# Patient Record
Sex: FEMALE | Race: BLACK OR AFRICAN AMERICAN | NOT HISPANIC OR LATINO | URBAN - METROPOLITAN AREA
[De-identification: names, ages, dates, MRNs, and addresses within clinical notes are randomized per-mention and may not be internally consistent; named-entity substitution may affect disease eponyms.]

---

## 2019-03-16 ENCOUNTER — EMERGENCY (EMERGENCY)
Facility: HOSPITAL | Age: 74
LOS: 1 days | Discharge: HOME | End: 2019-03-16
Attending: EMERGENCY MEDICINE | Admitting: EMERGENCY MEDICINE
Payer: MEDICARE

## 2019-03-16 VITALS
DIASTOLIC BLOOD PRESSURE: 79 MMHG | TEMPERATURE: 98 F | OXYGEN SATURATION: 100 % | HEART RATE: 16 BPM | RESPIRATION RATE: 16 BRPM | SYSTOLIC BLOOD PRESSURE: 107 MMHG

## 2019-03-16 PROCEDURE — 10140 I&D HMTMA SEROMA/FLUID COLLJ: CPT | Mod: LT

## 2019-03-16 PROCEDURE — 99282 EMERGENCY DEPT VISIT SF MDM: CPT | Mod: 25

## 2019-03-16 NOTE — ED ADULT TRIAGE NOTE - NS ED TRIAGE AVPU SCALE
Alert-The patient is alert, awake and responds to voice. The patient is oriented to time, place, and person. The triage nurse is able to obtain subjective information. Ambulatory

## 2019-03-16 NOTE — ED PROVIDER NOTE - NSFOLLOWUPINSTRUCTIONS_ED_ALL_ED_FT
Tylenol 500 mg (1 or 2 every 6 hours) and/or naproxen 500 mg (1 every 12 hours) for pain. Change dressing daily with bacitracin ointment, gauze or band-aid, and ACE wrap. If fluid accumulates, can squeeze it out. Return if spreading redness or if pain not controlled with oral medications.

## 2019-03-16 NOTE — ED PROVIDER NOTE - CLINICAL SUMMARY MEDICAL DECISION MAKING FREE TEXT BOX
73 y/o F with no significant PMHx presents to the ED c/o pain on the L foot. Plan- Will drain the blister and DC home with supportive care.

## 2019-03-16 NOTE — ED PROVIDER NOTE - PHYSICAL EXAMINATION
L ankle: Swollen with 4cm x 4cm blister on L heel. No erythema, no tenderness on any part of the foot. L ankle: Mild edema at lateral malleolus. 4cm x 4cm blister on L heel. No erythema, no tenderness on any part of the foot.    R ankle: Mild edema at lateral malleolus, less than L ankle.

## 2019-03-16 NOTE — ED ADULT TRIAGE NOTE - CHIEF COMPLAINT QUOTE
pt amb to triage c/o b/l ankle swelling (daughter states increased walking day before), boil developed this AM, increased pain w/ walking 2/2 boil, non-draining, fluid noted on back of heel, + pedal pulse, denies fever or chills

## 2019-03-16 NOTE — ED PROCEDURE NOTE - GENERAL PROCEDURE DETAILS
identified blister, cleaned site w/ alcohol swab, used 11-blade scalpel to poke hold in blister, squeezed out all the fluid, covered w/ bacitracin, gauze, and ACE wrap. Instructed daughter how to care for blister.

## 2019-03-16 NOTE — ED PROVIDER NOTE - OBJECTIVE STATEMENT
75 y/o F with no significant PMHx presents to the ED c/o pain on the L foot. Per daughter states pt walks for a long extended amount of time and feel pain when pt bear weights. The pain worsen with walking. Of note, no pain when resting. Pt denotes her L foot is swollen. Denies fever. No other acute complaints at time of eval.

## 2019-04-11 ENCOUNTER — INPATIENT (INPATIENT)
Facility: HOSPITAL | Age: 74
LOS: 7 days | Discharge: NURSING, REHAB OR LONG-TERM CARE | End: 2019-04-19
Attending: HOSPITALIST | Admitting: HOSPITALIST
Payer: MEDICARE

## 2019-04-11 VITALS
OXYGEN SATURATION: 98 % | HEART RATE: 85 BPM | RESPIRATION RATE: 16 BRPM | DIASTOLIC BLOOD PRESSURE: 94 MMHG | SYSTOLIC BLOOD PRESSURE: 132 MMHG | TEMPERATURE: 98 F

## 2019-04-11 DIAGNOSIS — R45.1 RESTLESSNESS AND AGITATION: ICD-10-CM

## 2019-04-11 LAB
ALBUMIN SERPL ELPH-MCNC: 4.3 G/DL — SIGNIFICANT CHANGE UP (ref 3.3–5)
ALP SERPL-CCNC: 89 U/L — SIGNIFICANT CHANGE UP (ref 40–120)
ALT FLD-CCNC: 9 U/L — SIGNIFICANT CHANGE UP (ref 4–33)
AMPHET UR-MCNC: NEGATIVE — SIGNIFICANT CHANGE UP
ANION GAP SERPL CALC-SCNC: 11 MMO/L — SIGNIFICANT CHANGE UP (ref 7–14)
APAP SERPL-MCNC: < 15 UG/ML — LOW (ref 15–25)
APPEARANCE UR: CLEAR — SIGNIFICANT CHANGE UP
AST SERPL-CCNC: 21 U/L — SIGNIFICANT CHANGE UP (ref 4–32)
BARBITURATES UR SCN-MCNC: NEGATIVE — SIGNIFICANT CHANGE UP
BASOPHILS # BLD AUTO: 0.02 K/UL — SIGNIFICANT CHANGE UP (ref 0–0.2)
BASOPHILS NFR BLD AUTO: 0.4 % — SIGNIFICANT CHANGE UP (ref 0–2)
BENZODIAZ UR-MCNC: NEGATIVE — SIGNIFICANT CHANGE UP
BILIRUB SERPL-MCNC: 0.6 MG/DL — SIGNIFICANT CHANGE UP (ref 0.2–1.2)
BILIRUB UR-MCNC: NEGATIVE — SIGNIFICANT CHANGE UP
BLOOD UR QL VISUAL: NEGATIVE — SIGNIFICANT CHANGE UP
BUN SERPL-MCNC: 10 MG/DL — SIGNIFICANT CHANGE UP (ref 7–23)
CALCIUM SERPL-MCNC: 9.7 MG/DL — SIGNIFICANT CHANGE UP (ref 8.4–10.5)
CANNABINOIDS UR-MCNC: NEGATIVE — SIGNIFICANT CHANGE UP
CHLORIDE SERPL-SCNC: 103 MMOL/L — SIGNIFICANT CHANGE UP (ref 98–107)
CO2 SERPL-SCNC: 27 MMOL/L — SIGNIFICANT CHANGE UP (ref 22–31)
COCAINE METAB.OTHER UR-MCNC: NEGATIVE — SIGNIFICANT CHANGE UP
COLOR SPEC: SIGNIFICANT CHANGE UP
CREAT SERPL-MCNC: 0.78 MG/DL — SIGNIFICANT CHANGE UP (ref 0.5–1.3)
EOSINOPHIL # BLD AUTO: 0.01 K/UL — SIGNIFICANT CHANGE UP (ref 0–0.5)
EOSINOPHIL NFR BLD AUTO: 0.2 % — SIGNIFICANT CHANGE UP (ref 0–6)
ETHANOL BLD-MCNC: < 10 MG/DL — SIGNIFICANT CHANGE UP
GLUCOSE SERPL-MCNC: 88 MG/DL — SIGNIFICANT CHANGE UP (ref 70–99)
GLUCOSE UR-MCNC: NEGATIVE — SIGNIFICANT CHANGE UP
HCT VFR BLD CALC: 39.8 % — SIGNIFICANT CHANGE UP (ref 34.5–45)
HGB BLD-MCNC: 12.8 G/DL — SIGNIFICANT CHANGE UP (ref 11.5–15.5)
IMM GRANULOCYTES NFR BLD AUTO: 0.2 % — SIGNIFICANT CHANGE UP (ref 0–1.5)
KETONES UR-MCNC: NEGATIVE — SIGNIFICANT CHANGE UP
LEUKOCYTE ESTERASE UR-ACNC: NEGATIVE — SIGNIFICANT CHANGE UP
LYMPHOCYTES # BLD AUTO: 1.75 K/UL — SIGNIFICANT CHANGE UP (ref 1–3.3)
LYMPHOCYTES # BLD AUTO: 32.6 % — SIGNIFICANT CHANGE UP (ref 13–44)
MCHC RBC-ENTMCNC: 25 PG — LOW (ref 27–34)
MCHC RBC-ENTMCNC: 32.2 % — SIGNIFICANT CHANGE UP (ref 32–36)
MCV RBC AUTO: 77.6 FL — LOW (ref 80–100)
METHADONE UR-MCNC: NEGATIVE — SIGNIFICANT CHANGE UP
MONOCYTES # BLD AUTO: 0.49 K/UL — SIGNIFICANT CHANGE UP (ref 0–0.9)
MONOCYTES NFR BLD AUTO: 9.1 % — SIGNIFICANT CHANGE UP (ref 2–14)
NEUTROPHILS # BLD AUTO: 3.09 K/UL — SIGNIFICANT CHANGE UP (ref 1.8–7.4)
NEUTROPHILS NFR BLD AUTO: 57.5 % — SIGNIFICANT CHANGE UP (ref 43–77)
NITRITE UR-MCNC: NEGATIVE — SIGNIFICANT CHANGE UP
NRBC # FLD: 0 K/UL — SIGNIFICANT CHANGE UP (ref 0–0)
OPIATES UR-MCNC: NEGATIVE — SIGNIFICANT CHANGE UP
OXYCODONE UR-MCNC: NEGATIVE — SIGNIFICANT CHANGE UP
PCP UR-MCNC: NEGATIVE — SIGNIFICANT CHANGE UP
PH UR: 6.5 — SIGNIFICANT CHANGE UP (ref 5–8)
PLATELET # BLD AUTO: 202 K/UL — SIGNIFICANT CHANGE UP (ref 150–400)
PMV BLD: 10 FL — SIGNIFICANT CHANGE UP (ref 7–13)
POTASSIUM SERPL-MCNC: 3.9 MMOL/L — SIGNIFICANT CHANGE UP (ref 3.5–5.3)
POTASSIUM SERPL-SCNC: 3.9 MMOL/L — SIGNIFICANT CHANGE UP (ref 3.5–5.3)
PROT SERPL-MCNC: 7.8 G/DL — SIGNIFICANT CHANGE UP (ref 6–8.3)
PROT UR-MCNC: NEGATIVE — SIGNIFICANT CHANGE UP
RBC # BLD: 5.13 M/UL — SIGNIFICANT CHANGE UP (ref 3.8–5.2)
RBC # FLD: 13.7 % — SIGNIFICANT CHANGE UP (ref 10.3–14.5)
SALICYLATES SERPL-MCNC: < 5 MG/DL — LOW (ref 15–30)
SODIUM SERPL-SCNC: 141 MMOL/L — SIGNIFICANT CHANGE UP (ref 135–145)
SP GR SPEC: 1.01 — SIGNIFICANT CHANGE UP (ref 1–1.04)
TSH SERPL-MCNC: 1.33 UIU/ML — SIGNIFICANT CHANGE UP (ref 0.27–4.2)
UROBILINOGEN FLD QL: NORMAL — SIGNIFICANT CHANGE UP
WBC # BLD: 5.37 K/UL — SIGNIFICANT CHANGE UP (ref 3.8–10.5)
WBC # FLD AUTO: 5.37 K/UL — SIGNIFICANT CHANGE UP (ref 3.8–10.5)

## 2019-04-11 PROCEDURE — 70450 CT HEAD/BRAIN W/O DYE: CPT | Mod: 26
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
## 2019-04-11 NOTE — ED PROVIDER NOTE - CLINICAL SUMMARY MEDICAL DECISION MAKING FREE TEXT BOX
Jonathan Weil, PGY2 - well appearing but still with need to r/o secondary causes of delirium. If no sig findings then admit for titration of mood control meds and placement. Pt c h/o dementia p/w confusion and agitation.  Well appearing, no apparent injuries, but still with need to r/o secondary causes of delirium. If no sig findings then admit for titration of mood control meds and placement.
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
## 2019-04-11 NOTE — ED PROVIDER NOTE - ATTENDING CONTRIBUTION TO CARE
Attending Attestation: Dr. Jennings  I have personally performed a history and physical examination of the patient and discussed management with the resident as well as the patient.  I reviewed the resident's note and agree with the documented findings and plan of care.  I have authored and modified critical sections of the Provider Note, including but not limited to HPI, Physical Exam and MDM. Pt c h/o dementia p/w confusion and agitation.  Well appearing, no apparent injuries, but still with need to r/o secondary causes of delirium. If no sig findings then admit for titration of mood control meds and placement.

## 2019-04-11 NOTE — ED PROVIDER NOTE - NEUROLOGICAL, MLM
Alert and oriented, no focal deficits, no motor or sensory deficits. Inappropriate responses to many questions
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
## 2019-04-12 DIAGNOSIS — R45.1 RESTLESSNESS AND AGITATION: ICD-10-CM

## 2019-04-12 DIAGNOSIS — F01.51 VASCULAR DEMENTIA, UNSPECIFIED SEVERITY, WITH BEHAVIORAL DISTURBANCE: ICD-10-CM

## 2019-04-12 DIAGNOSIS — F03.91 UNSPECIFIED DEMENTIA WITH BEHAVIORAL DISTURBANCE: ICD-10-CM

## 2019-04-12 DIAGNOSIS — E78.5 HYPERLIPIDEMIA, UNSPECIFIED: ICD-10-CM

## 2019-04-12 LAB
HCV AB S/CO SERPL IA: 0.15 S/CO — SIGNIFICANT CHANGE UP (ref 0–0.99)
HCV AB SERPL-IMP: SIGNIFICANT CHANGE UP

## 2019-04-12 PROCEDURE — 99223 1ST HOSP IP/OBS HIGH 75: CPT

## 2019-04-12 PROCEDURE — 12345: CPT | Mod: NC

## 2019-04-12 RX ORDER — ATORVASTATIN CALCIUM 80 MG/1
20 TABLET, FILM COATED ORAL AT BEDTIME
Qty: 0 | Refills: 0 | Status: DISCONTINUED | OUTPATIENT
Start: 2019-04-12 | End: 2019-04-19

## 2019-04-12 RX ORDER — HALOPERIDOL DECANOATE 100 MG/ML
0.5 INJECTION INTRAMUSCULAR EVERY 6 HOURS
Qty: 0 | Refills: 0 | Status: DISCONTINUED | OUTPATIENT
Start: 2019-04-12 | End: 2019-04-19

## 2019-04-12 RX ORDER — ACETAMINOPHEN 500 MG
650 TABLET ORAL EVERY 6 HOURS
Qty: 0 | Refills: 0 | Status: DISCONTINUED | OUTPATIENT
Start: 2019-04-12 | End: 2019-04-19

## 2019-04-12 RX ORDER — QUETIAPINE FUMARATE 200 MG/1
12.5 TABLET, FILM COATED ORAL
Qty: 0 | Refills: 0 | Status: DISCONTINUED | OUTPATIENT
Start: 2019-04-12 | End: 2019-04-12

## 2019-04-12 RX ORDER — QUETIAPINE FUMARATE 200 MG/1
12.5 TABLET, FILM COATED ORAL EVERY 6 HOURS
Qty: 0 | Refills: 0 | Status: DISCONTINUED | OUTPATIENT
Start: 2019-04-12 | End: 2019-04-19

## 2019-04-12 RX ORDER — QUETIAPINE FUMARATE 200 MG/1
25 TABLET, FILM COATED ORAL
Qty: 0 | Refills: 0 | Status: DISCONTINUED | OUTPATIENT
Start: 2019-04-12 | End: 2019-04-19

## 2019-04-12 RX ADMIN — ATORVASTATIN CALCIUM 20 MILLIGRAM(S): 80 TABLET, FILM COATED ORAL at 21:10

## 2019-04-12 RX ADMIN — HALOPERIDOL DECANOATE 0.5 MILLIGRAM(S): 100 INJECTION INTRAMUSCULAR at 16:46

## 2019-04-12 RX ADMIN — QUETIAPINE FUMARATE 25 MILLIGRAM(S): 200 TABLET, FILM COATED ORAL at 21:10

## 2019-04-12 NOTE — H&P ADULT - NSHPPHYSICALEXAM_GEN_ALL_CORE
T(C): 36.5 (04-12-19 @ 00:43), Max: 37 (04-11-19 @ 17:46)  HR: 74 (04-12-19 @ 00:43) (72 - 85)  BP: 136/76 (04-12-19 @ 00:43) (132/94 - 153/95)  RR: 16 (04-12-19 @ 00:43) (16 - 16)  SpO2: 100% (04-12-19 @ 00:43) (97% - 100%)    GENERAL: No acute distress, well-developed  HEAD:  Atraumatic, Normocephalic  ENT: EOMI, PERRLA, conjunctiva and sclera clear, Neck supple, No JVD, moist mucosa, no pharyngeal erythema, no tonsillar enlargement or exudate  CHEST/LUNG: Clear to auscultation bilaterally; No wheeze, equal breath sounds bilaterally   HEART: Regular rate and rhythm; No murmurs, rubs, or gallops  ABDOMEN: Soft, Nontender, Nondistended; Bowel sounds present, no organomegaly  EXTREMITIES:  2+ Peripheral Pulses, No clubbing, cyanosis, or edema  PSYCH: AAOx2 to person and place, normal affect, normal behavior   NEUROLOGY: non-focal, cranial nerves intact  SKIN: Normal color, No rashes or lesions

## 2019-04-12 NOTE — H&P ADULT - PROBLEM SELECTOR PLAN 1
- Pt appears calm in ED  - No apparent toxic metabolic derangements. No signs or symptoms of infection   - Chronic microvascular changes on CT head  - Behavior likely related to known dementia. Likely progressing  - Consider psych eval in AM  - Continue quetiapine - Pt appears calm in ED  - No apparent toxic metabolic derangements. No signs or symptoms of infection   - Chronic microvascular changes on CT head  - Behavior likely related to known dementia. Likely progressing  - Consider psych eval in AM  - Haldol prn agitation. Check EKG for QTc

## 2019-04-12 NOTE — BEHAVIORAL HEALTH ASSESSMENT NOTE - NSBHCHARTREVIEWIMAGING_PSY_A_CORE FT
EXAM:  CT BRAIN    PROCEDURE DATE:  Apr 11 2019   INTERPRETATION:  HISTORY: Confusion, delirium  TECHNIQUE: CT of the head was performed without contrast.  Multiple contiguous axial images were acquired from the skullbase to the   vertex without the administration of intravenous contrast.  Coronal and   sagittal reformations were made.  COMPARISON: None.  FINDINGS:  Prominent ventricles and sulci compatible with age-related volume loss.   No acute hemorrhage, mass effect, midline shift, hydrocephalus, or   extra-axial fluid collections. There is severe patchy hypoattenuation   within the white matter, likely secondary to chronic microvascular   changes.  The calvarium is intact. The visualized intraorbital compartments,   paranasal sinuses and mastoid complexes appear free of acute disease.  IMPRESSION:  No acute intracranial hemorrhage or mass effect from vasogenic edema.   Extensive chronic microvascular changes which, in the absence of prior   study, may mask white matter lacunar infarcts.

## 2019-04-12 NOTE — BEHAVIORAL HEALTH ASSESSMENT NOTE - NSBHCONSULTRECOMMENDOTHER_PSY_A_CORE FT
- Monitor EKG for qtc; if qtc >500ms, would need to discontinue antipsychotics.     - To minimize risk of delirium: the patient would also benefit from maintenance of regular sleep/wake cycles, frequent re-orientation, family member at bedside, ensuring personal eyeglasses or hearing aides available if used, avoidance of benzodiazepines and anticholinergic medications, and judicious use of opiates for pain control if necessary.
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
## 2019-04-12 NOTE — BEHAVIORAL HEALTH ASSESSMENT NOTE - RISK ASSESSMENT
Given patient's history of wandering and getting lost, she is not safe for discharge. No risk of harm to self. Pt is at risk for harm to others in the setting of her dementia with unpredictable behavior.
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
## 2019-04-12 NOTE — DISCHARGE NOTE PROVIDER - NSFOLLOWUPCLINICS_GEN_ALL_ED_FT
[facility]  Internal Medicine  [address] [place] [address]  Phone: [phone]  Fax:   Follow Up Time:     ARIA Meneses [facility]  Psychiatry  [address] [facility] [address]  Phone: [phone]  Fax:   Follow Up Time:

## 2019-04-12 NOTE — CHART NOTE - NSCHARTNOTEFT_GEN_A_CORE
pt seen and examined. VS and labs reviewed. Pt remained calm no new complaint. Infectious w/u unremarkable. Behavior disturbance most likely d/t progression of underlying dementia. Will obtain psych consult to help manage behavior issues. Anticipate discharge to long term care facility

## 2019-04-12 NOTE — BEHAVIORAL HEALTH ASSESSMENT NOTE - OTHER
most days of the week with daughter; sometimes in own apartment alone or with a HHA patient in bed, unable to assess Patient perseverating on salmon

## 2019-04-12 NOTE — BEHAVIORAL HEALTH ASSESSMENT NOTE - NSBHCHARTREVIEWLAB_PSY_A_CORE FT
CBC Full  -  ( 2019 17:44 )  WBC Count : 5.37 K/uL  RBC Count : 5.13 M/uL  Hemoglobin : 12.8 g/dL  Hematocrit : 39.8 %  Platelet Count - Automated : 202 K/uL  Mean Cell Volume : 77.6 fL  Mean Cell Hemoglobin : 25.0 pg  Mean Cell Hemoglobin Concentration : 32.2 %  Auto Neutrophil # : 3.09 K/uL  Auto Lymphocyte # : 1.75 K/uL  Auto Monocyte # : 0.49 K/uL  Auto Eosinophil # : 0.01 K/uL  Auto Basophil # : 0.02 K/uL  Auto Neutrophil % : 57.5 %  Auto Lymphocyte % : 32.6 %  Auto Monocyte % : 9.1 %  Auto Eosinophil % : 0.2 %  Auto Basophil % : 0.4 %        141  |  103  |  10  ----------------------------<  88  3.9   |  27  |  0.78    Ca    9.7      2019 17:44    TPro  7.8  /  Alb  4.3  /  TBili  0.6  /  DBili  x   /  AST  21  /  ALT  9   /  AlkPhos  89  04-11    Urinalysis Basic - ( 2019 20:20 )    Color: LIGHT YELLOW / Appearance: CLEAR / S.012 / pH: 6.5  Gluc: NEGATIVE / Ketone: NEGATIVE  / Bili: NEGATIVE / Urobili: NORMAL   Blood: NEGATIVE / Protein: NEGATIVE / Nitrite: NEGATIVE   Leuk Esterase: NEGATIVE / RBC: x / WBC x   Sq Epi: x / Non Sq Epi: x / Bacteria: x    Negative toxicology ()    Negative Hep C ()

## 2019-04-12 NOTE — DISCHARGE NOTE PROVIDER - NSDCCPCAREPLAN_GEN_ALL_CORE_FT
PRINCIPAL DISCHARGE DIAGNOSIS  Diagnosis: Agitation  Assessment and Plan of Treatment: Continue with Seroquel and supportive care.  Patient may follow-up with the Geriatric Psychiatric clinic at [facility] - Call [phone] if in need of psychiatric care.

## 2019-04-12 NOTE — BEHAVIORAL HEALTH ASSESSMENT NOTE - NSBHADMITCOUNSEL_PSY_A_CORE
prognosis/risk factor reduction/importance of adherence to chosen treatment/client/family/caregiver education/risks and benefits of treatment options

## 2019-04-12 NOTE — H&P ADULT - PROBLEM SELECTOR PLAN 2
- Increasingly aggressive behavior. daughter stating it's difficult to care for her at home. Requesting NH placement  - WENDI consult in AM for placement

## 2019-04-12 NOTE — BEHAVIORAL HEALTH ASSESSMENT NOTE - ORIENTATION OTHER
Patient took a few tries to answer the year correctly and thought it was either 2009 or 2019 but she knew the month and not the date. Patient did not know name of hospital.

## 2019-04-12 NOTE — PHYSICAL THERAPY INITIAL EVALUATION ADULT - ADDITIONAL COMMENTS
social history obtained from patient. Pt. reports she lives in a private home with family. Pt. returned supine in bed with all tubes/lines intact, call bell in reach and in NAD.

## 2019-04-12 NOTE — PHYSICAL THERAPY INITIAL EVALUATION ADULT - PATIENT PROFILE REVIEW, REHAB EVAL
yes/Pt. profile reviewed, consulted with RN Yanique WILSON prior to initial PT evaluation and tx, as per RN, Pt. is OK to participate in skilled therapy session, current activity orders; ambulate as tolerated.
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
## 2019-04-12 NOTE — H&P ADULT - NSHPLABSRESULTS_GEN_ALL_CORE
.  LABS:                         12.8   5.37  )-----------( 202      ( 2019 17:44 )             39.8     04-11    141  |  103  |  10  ----------------------------<  88  3.9   |  27  |  0.78    Ca    9.7      2019 17:44    TPro  7.8  /  Alb  4.3  /  TBili  0.6  /  DBili  x   /  AST  21  /  ALT  9   /  AlkPhos  89  04-11      Urinalysis Basic - ( 2019 20:20 )    Color: LIGHT YELLOW / Appearance: CLEAR / S.012 / pH: 6.5  Gluc: NEGATIVE / Ketone: NEGATIVE  / Bili: NEGATIVE / Urobili: NORMAL   Blood: NEGATIVE / Protein: NEGATIVE / Nitrite: NEGATIVE   Leuk Esterase: NEGATIVE / RBC: x / WBC x   Sq Epi: x / Non Sq Epi: x / Bacteria: x                RADIOLOGY, EKG & ADDITIONAL TESTS: Reviewed.

## 2019-04-12 NOTE — PHYSICAL THERAPY INITIAL EVALUATION ADULT - DISCHARGE DISPOSITION, PT EVAL
anticipated discharge to home with no skilled restorative physical therapy services upon discharge from [facility]. Please follow therapy for continued assessment.

## 2019-04-12 NOTE — PHYSICAL THERAPY INITIAL EVALUATION ADULT - PERTINENT HX OF CURRENT PROBLEM, REHAB EVAL
Pt. is a 74 year old female admitted to [facility] due to restlessness and agitation.

## 2019-04-12 NOTE — H&P ADULT - NSHPREVIEWOFSYSTEMS_GEN_ALL_CORE
REVIEW OF SYSTEMS:    CONSTITUTIONAL: No weakness, fevers or chills, no weight loss  EYES/ENT: No visual changes;  No dysphagia or odynophagia, no tinnitus  NECK: No pain or stiffness  RESPIRATORY: No cough, wheezing, hemoptysis; No shortness of breath  CARDIOVASCULAR: No chest pain or palpitations; No lower extremity edema  GASTROINTESTINAL: No abdominal or epigastric pain. No nausea, vomiting, or hematemesis; No diarrhea or constipation. No melena or hematochezia.  MUSCULOSKELETAL: No joint pain, swelling, erythema or warmth, no back pain  GENITOURINARY: No dysuria, frequency or hematuria, no suprapubic pain  NEUROLOGICAL: No numbness or weakness, no headache, no syncope, no gait abnormalities   SKIN: No itching, burning, rashes, or lesions   All other review of systems is negative unless indicated above.

## 2019-04-12 NOTE — BEHAVIORAL HEALTH ASSESSMENT NOTE - NSBHCHARTREVIEWVS_PSY_A_CORE FT
Vital Signs Last 24 Hrs  T(C): 36.8 (12 Apr 2019 10:43), Max: 37.1 (12 Apr 2019 07:13)  T(F): 98.2 (12 Apr 2019 10:43), Max: 98.7 (12 Apr 2019 07:13)  HR: 92 (12 Apr 2019 10:43) (72 - 92)  BP: 143/95 (12 Apr 2019 10:43) (127/67 - 153/95)  BP(mean): --  RR: 18 (12 Apr 2019 10:43) (16 - 18)  SpO2: 100% (12 Apr 2019 10:43) (97% - 100%)

## 2019-04-13 LAB
BACTERIA UR CULT: SIGNIFICANT CHANGE UP
SPECIMEN SOURCE: SIGNIFICANT CHANGE UP

## 2019-04-13 PROCEDURE — 99232 SBSQ HOSP IP/OBS MODERATE 35: CPT

## 2019-04-13 RX ADMIN — QUETIAPINE FUMARATE 25 MILLIGRAM(S): 200 TABLET, FILM COATED ORAL at 18:01

## 2019-04-13 RX ADMIN — Medication 650 MILLIGRAM(S): at 13:25

## 2019-04-13 RX ADMIN — ATORVASTATIN CALCIUM 20 MILLIGRAM(S): 80 TABLET, FILM COATED ORAL at 22:01

## 2019-04-13 RX ADMIN — Medication 650 MILLIGRAM(S): at 12:40

## 2019-04-14 PROCEDURE — 99232 SBSQ HOSP IP/OBS MODERATE 35: CPT

## 2019-04-14 RX ADMIN — QUETIAPINE FUMARATE 25 MILLIGRAM(S): 200 TABLET, FILM COATED ORAL at 17:16

## 2019-04-14 RX ADMIN — ATORVASTATIN CALCIUM 20 MILLIGRAM(S): 80 TABLET, FILM COATED ORAL at 21:13

## 2019-04-15 LAB
FOLATE SERPL-MCNC: 6.8 NG/ML — SIGNIFICANT CHANGE UP (ref 4.7–20)
T PALLIDUM AB TITR SER: NEGATIVE — SIGNIFICANT CHANGE UP
VIT B12 SERPL-MCNC: 716 PG/ML — SIGNIFICANT CHANGE UP (ref 200–900)

## 2019-04-15 PROCEDURE — 99232 SBSQ HOSP IP/OBS MODERATE 35: CPT

## 2019-04-15 RX ADMIN — QUETIAPINE FUMARATE 25 MILLIGRAM(S): 200 TABLET, FILM COATED ORAL at 17:38

## 2019-04-15 RX ADMIN — ATORVASTATIN CALCIUM 20 MILLIGRAM(S): 80 TABLET, FILM COATED ORAL at 21:10

## 2019-04-16 PROCEDURE — 99232 SBSQ HOSP IP/OBS MODERATE 35: CPT

## 2019-04-16 RX ADMIN — ATORVASTATIN CALCIUM 20 MILLIGRAM(S): 80 TABLET, FILM COATED ORAL at 22:12

## 2019-04-16 RX ADMIN — QUETIAPINE FUMARATE 25 MILLIGRAM(S): 200 TABLET, FILM COATED ORAL at 17:47

## 2019-04-17 PROCEDURE — 99233 SBSQ HOSP IP/OBS HIGH 50: CPT

## 2019-04-17 PROCEDURE — 99232 SBSQ HOSP IP/OBS MODERATE 35: CPT

## 2019-04-17 RX ADMIN — QUETIAPINE FUMARATE 25 MILLIGRAM(S): 200 TABLET, FILM COATED ORAL at 18:34

## 2019-04-17 RX ADMIN — ATORVASTATIN CALCIUM 20 MILLIGRAM(S): 80 TABLET, FILM COATED ORAL at 22:00

## 2019-04-17 NOTE — PROGRESS NOTE BEHAVIORAL HEALTH - CASE SUMMARY
Patient seen in follow-up, chart reviewed. Plan as above- will c/w standing Seroquel, pt has not been receiving prns. Pt awaiting [facility] placement.

## 2019-04-17 NOTE — PROGRESS NOTE BEHAVIORAL HEALTH - NSBHCHARTREVIEWVS_PSY_A_CORE FT
Vital Signs Last 24 Hrs  T(C): 36.8 (17 Apr 2019 06:55), Max: 36.8 (17 Apr 2019 06:55)  T(F): 98.2 (17 Apr 2019 06:55), Max: 98.2 (17 Apr 2019 06:55)  HR: 54 (17 Apr 2019 06:55) (54 - 69)  BP: 140/74 (17 Apr 2019 06:55) (109/67 - 140/74)  BP(mean): --  RR: 18 (17 Apr 2019 06:55) (18 - 18)  SpO2: 100% (17 Apr 2019 06:55) (100% - 100%)
Vital Signs Last 24 Hrs  T(C): 36.3 (15 Apr 2019 06:10), Max: 37 (14 Apr 2019 12:53)  T(F): 97.3 (15 Apr 2019 06:10), Max: 98.6 (14 Apr 2019 12:53)  HR: 70 (15 Apr 2019 06:10) (70 - 88)  BP: 115/81 (15 Apr 2019 06:10) (104/68 - 120/90)  BP(mean): --  RR: 18 (15 Apr 2019 06:10) (18 - 18)  SpO2: 96% (15 Apr 2019 06:10) (93% - 98%)

## 2019-04-17 NOTE — PROGRESS NOTE BEHAVIORAL HEALTH - NSBHFUPINTERVALCCFT_PSY_A_CORE
No acute events overnight. No PRNs over the weekend. Patient is feeling well and sleeping and eating well. She reports that she will be going home today and that her daughter was picking her up later.
No acute events or PRNs overnight. Patient is feeling good but wants to go home.    and sleeping and eating well. She reports that she will be going home today and that her daughter was picking her up later.
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
## 2019-04-17 NOTE — PROGRESS NOTE BEHAVIORAL HEALTH - NSBHCONSULTMEDAGITATION_PSY_A_CORE FT
Haldol 0.5mg IM/IV q6 PRN   Seroquel 12.5mg PO q6 PRN
Haldol 0.5mg IM/IV q6 PRN   Seroquel 12.5mg PO q6 PRN

## 2019-04-17 NOTE — PROGRESS NOTE BEHAVIORAL HEALTH - ORIENTATION OTHER
Patient knew it was spring, but did not know date, day of week, month, or year. Patient knew she was in the hospital, but did not know name or city. Patient knew her name. Patient did not name the current president
Patient knew it was 2019, but did not know season, month, or date. Patient knew she was in the hospital, but did not know name or city.

## 2019-04-18 DIAGNOSIS — E43 UNSPECIFIED SEVERE PROTEIN-CALORIE MALNUTRITION: ICD-10-CM

## 2019-04-18 PROCEDURE — 99232 SBSQ HOSP IP/OBS MODERATE 35: CPT

## 2019-04-18 RX ADMIN — ATORVASTATIN CALCIUM 20 MILLIGRAM(S): 80 TABLET, FILM COATED ORAL at 22:43

## 2019-04-18 RX ADMIN — QUETIAPINE FUMARATE 25 MILLIGRAM(S): 200 TABLET, FILM COATED ORAL at 18:07

## 2019-04-18 NOTE — DIETITIAN INITIAL EVALUATION ADULT. - PHYSICAL APPEARANCE
Nutrition focused physical exam conducted - Subcutaneous fat loss: [moderate] Orbital fat pads region.  Muscle wasting: [moderate]Temples region, [moderate]Clavicle region, [moderate]Shoulder region.

## 2019-04-18 NOTE — DIETITIAN INITIAL EVALUATION ADULT. - ENERGY NEEDS
Ht: 66 inches Wt: 112.4 pounds  BMI: 18 kg/m2 IBW: 130 pounds (+/-10%) %IBW: 86%  Edema: no edema noted.  Skin: intact, no pressure injuries noted

## 2019-04-18 NOTE — DIETITIAN INITIAL EVALUATION ADULT. - PERTINENT MEDS FT
MEDICATIONS  (STANDING):  atorvastatin 20 milliGRAM(s) Oral at bedtime  QUEtiapine 25 milliGRAM(s) Oral <User Schedule>    MEDICATIONS  (PRN):  acetaminophen   Tablet .. 650 milliGRAM(s) Oral every 6 hours PRN Moderate Pain (4 - 6)  haloperidol    Injectable 0.5 milliGRAM(s) IntraMuscular every 6 hours PRN severe agitation  haloperidol    Injectable 0.5 milliGRAM(s) IV Push every 6 hours PRN severe agitation  QUEtiapine 12.5 milliGRAM(s) Oral every 6 hours PRN Agitation

## 2019-04-18 NOTE — DIETITIAN INITIAL EVALUATION ADULT. - ORAL INTAKE PTA
Patient reported good po intake PTA- however suspect limited PO due to muscle and fat wasting and patient reported losing weight over the past few months.

## 2019-04-18 NOTE — DIETITIAN INITIAL EVALUATION ADULT. - PROBLEM SELECTOR PLAN 1
- Pt appears calm in ED  - No apparent toxic metabolic derangements. No signs or symptoms of infection   - Chronic microvascular changes on CT head  - Behavior likely related to known dementia. Likely progressing  - Consider psych eval in AM  - Haldol prn agitation. Check EKG for QTc
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
## 2019-04-19 VITALS
DIASTOLIC BLOOD PRESSURE: 66 MMHG | RESPIRATION RATE: 18 BRPM | TEMPERATURE: 99 F | SYSTOLIC BLOOD PRESSURE: 119 MMHG | HEART RATE: 79 BPM | OXYGEN SATURATION: 100 %

## 2019-04-19 PROCEDURE — 99239 HOSP IP/OBS DSCHRG MGMT >30: CPT

## 2019-04-19 RX ORDER — ATORVASTATIN CALCIUM 80 MG/1
1 TABLET, FILM COATED ORAL
Qty: 0 | Refills: 0 | COMMUNITY
Start: 2019-04-19

## 2019-04-19 RX ORDER — QUETIAPINE FUMARATE 200 MG/1
1 TABLET, FILM COATED ORAL
Qty: 0 | Refills: 0 | COMMUNITY
Start: 2019-04-19

## 2019-04-19 RX ORDER — ACETAMINOPHEN 500 MG
2 TABLET ORAL
Qty: 0 | Refills: 0 | COMMUNITY
Start: 2019-04-19

## 2019-04-19 RX ORDER — QUETIAPINE FUMARATE 200 MG/1
0 TABLET, FILM COATED ORAL
Qty: 0 | Refills: 0 | COMMUNITY

## 2019-04-19 RX ORDER — ATORVASTATIN CALCIUM 80 MG/1
1 TABLET, FILM COATED ORAL
Qty: 0 | Refills: 0 | COMMUNITY

## 2019-04-19 NOTE — PROGRESS NOTE ADULT - SUBJECTIVE AND OBJECTIVE BOX
Patient is a 74y old  Female who presents with a chief complaint of Aggressive behavior (16 Apr 2019 13:23)      SUBJECTIVE / OVERNIGHT EVENTS:    No acute event o/n.  Pt remains calm. No prn sedatives needed. She offers no new complaint    Review of Systems:    RESPIRATORY: No cough, wheezing, chills or hemoptysis; No shortness of breath  CARDIOVASCULAR: No chest pain, palpitations, dizziness, or leg swelling  GASTROINTESTINAL: No abdominal or epigastric pain. No nausea, vomiting, or hematemesis; No diarrhea or constipation. No melena or hematochezia.      MEDICATIONS  (STANDING):  atorvastatin 20 milliGRAM(s) Oral at bedtime  QUEtiapine 25 milliGRAM(s) Oral <User Schedule>    MEDICATIONS  (PRN):  acetaminophen   Tablet .. 650 milliGRAM(s) Oral every 6 hours PRN Moderate Pain (4 - 6)  haloperidol    Injectable 0.5 milliGRAM(s) IntraMuscular every 6 hours PRN severe agitation  haloperidol    Injectable 0.5 milliGRAM(s) IV Push every 6 hours PRN severe agitation  QUEtiapine 12.5 milliGRAM(s) Oral every 6 hours PRN Agitation      PHYSICAL EXAM:  T(C): 36.8 (04-17-19 @ 06:55), Max: 36.8 (04-17-19 @ 06:55)  HR: 54 (04-17-19 @ 06:55) (54 - 69)  BP: 140/74 (04-17-19 @ 06:55) (109/67 - 140/74)  RR: 18 (04-17-19 @ 06:55) (18 - 18)  SpO2: 100% (04-17-19 @ 06:55) (100% - 100%)  I&O's Summary      GENERAL: thin female sitting in chair in NAD   MENTAL STATUS/PSYCH:  AAO x1-2 (self, hospital)   HEAD:  Atraumatic, Normocephalic  EYES: EOMI, PERRLA, conjunctiva and sclera clear  NECK: Supple, No elevated JVD  CHEST/LUNG: Clear to auscultation bilaterally; No wheeze  HEART: Regular rate and rhythm; No murmurs, rubs, or gallops  ABDOMEN: Soft, Nontender, Nondistended; Bowel sounds present  EXTREMITIES:  2+ Peripheral Pulses, No clubbing, cyanosis, or edema  NEUROLOGY: CN II-XII grossly intact, moving all extremities  SKIN: No rashes or lesions      LABS:  CAPILLARY BLOOD GLUCOSE                          RADIOLOGY & ADDITIONAL TESTS:    Imaging Personally Reviewed:    Consultant(s) Notes Reviewed:      Care Discussed with Consultants/Other Providers:
Patient is a 74y old  Female who presents with a chief complaint of Aggressive behavior (13 Apr 2019 11:24)    SUBJECTIVE / OVERNIGHT EVENTS:    No acute events overnight. No agitation as per the nursing staff.       MEDICATIONS  (STANDING):  atorvastatin 20 milliGRAM(s) Oral at bedtime  QUEtiapine 25 milliGRAM(s) Oral <User Schedule>    MEDICATIONS  (PRN):  acetaminophen   Tablet .. 650 milliGRAM(s) Oral every 6 hours PRN Moderate Pain (4 - 6)  haloperidol    Injectable 0.5 milliGRAM(s) IntraMuscular every 6 hours PRN severe agitation  haloperidol    Injectable 0.5 milliGRAM(s) IV Push every 6 hours PRN severe agitation  QUEtiapine 12.5 milliGRAM(s) Oral every 6 hours PRN Agitation        CAPILLARY BLOOD GLUCOSE        I&O's Summary    Vital Signs Last 24 Hrs  T(C): 36.7 (14 Apr 2019 04:56), Max: 36.7 (13 Apr 2019 21:57)  T(F): 98 (14 Apr 2019 04:56), Max: 98 (13 Apr 2019 21:57)  HR: 68 (14 Apr 2019 04:56) (68 - 86)  BP: 120/57 (14 Apr 2019 04:56) (120/57 - 139/84)  BP(mean): --  RR: 18 (14 Apr 2019 04:56) (17 - 18)  SpO2: 100% (14 Apr 2019 04:56) (98% - 100%)    PHYSICAL EXAM  GENERAL: NAD, well-developed, Thin  HEAD:  Atraumatic, Normocephalic  EYES: EOMI, PERRLA, conjunctiva and sclera clear  NECK: Supple, No JVD  CHEST/LUNG: Clear to auscultation bilaterally; No wheeze  HEART: Regular rate and rhythm; No murmurs, rubs, or gallops  ABDOMEN: Soft, Nontender, Nondistended; Bowel sounds present  EXTREMITIES:  2+ Peripheral Pulses, No clubbing, cyanosis, or edema  PSYCH: AAOx1  SKIN: No rashes or lesions  LABS:                    RADIOLOGY & ADDITIONAL TESTS:    Imaging Personally Reviewed:  Consultant(s) Notes Reviewed:    Care Discussed with Consultants/Other Providers:
Patient is a 74y old  Female who presents with a chief complaint of Aggressive behavior (14 Apr 2019 12:46)      SUBJECTIVE / OVERNIGHT EVENTS:    No acute event o/n. Pt remains calm w/o behavior issues     Review of Systems:    RESPIRATORY: No cough, wheezing, chills or hemoptysis; No shortness of breath  CARDIOVASCULAR: No chest pain, palpitations, dizziness, or leg swelling  GASTROINTESTINAL: No abdominal or epigastric pain. No nausea, vomiting, or hematemesis; No diarrhea or constipation. No melena or hematochezia.      MEDICATIONS  (STANDING):  atorvastatin 20 milliGRAM(s) Oral at bedtime  QUEtiapine 25 milliGRAM(s) Oral <User Schedule>    MEDICATIONS  (PRN):  acetaminophen   Tablet .. 650 milliGRAM(s) Oral every 6 hours PRN Moderate Pain (4 - 6)  haloperidol    Injectable 0.5 milliGRAM(s) IntraMuscular every 6 hours PRN severe agitation  haloperidol    Injectable 0.5 milliGRAM(s) IV Push every 6 hours PRN severe agitation  QUEtiapine 12.5 milliGRAM(s) Oral every 6 hours PRN Agitation      PHYSICAL EXAM:  T(C): 36.5 (04-15-19 @ 14:03), Max: 36.8 (04-14-19 @ 21:57)  HR: 93 (04-15-19 @ 14:03) (70 - 93)  BP: 146/77 (04-15-19 @ 14:03) (104/68 - 146/77)  RR: 18 (04-15-19 @ 14:03) (18 - 18)  SpO2: 100% (04-15-19 @ 14:03) (93% - 100%)  I&O's Summary    GENERAL: thin female sitting in chair in NAD   MENTAL STATUS/PSYCH:  AAO x1-2 (self, hospital)   HEAD:  Atraumatic, Normocephalic  EYES: EOMI, PERRLA, conjunctiva and sclera clear  NECK: Supple, No elevated JVD  CHEST/LUNG: Clear to auscultation bilaterally; No wheeze  HEART: Regular rate and rhythm; No murmurs, rubs, or gallops  ABDOMEN: Soft, Nontender, Nondistended; Bowel sounds present  EXTREMITIES:  2+ Peripheral Pulses, No clubbing, cyanosis, or edema  NEUROLOGY: CN II-XII grossly intact, moving all extremities  SKIN: No rashes or lesions    LABS:  CAPILLARY BLOOD GLUCOSE
Patient is a 74y old  Female who presents with a chief complaint of Aggressive behavior (15 Apr 2019 14:17)      SUBJECTIVE / OVERNIGHT EVENTS:    No acute event. Pt remains calm. No new complaint     Review of Systems:    RESPIRATORY: No cough, wheezing, chills or hemoptysis; No shortness of breath  CARDIOVASCULAR: No chest pain, palpitations, dizziness, or leg swelling  GASTROINTESTINAL: No abdominal or epigastric pain. No nausea, vomiting, or hematemesis; No diarrhea or constipation. No melena or hematochezia.    MEDICATIONS  (STANDING):  atorvastatin 20 milliGRAM(s) Oral at bedtime  QUEtiapine 25 milliGRAM(s) Oral <User Schedule>    MEDICATIONS  (PRN):  acetaminophen   Tablet .. 650 milliGRAM(s) Oral every 6 hours PRN Moderate Pain (4 - 6)  haloperidol    Injectable 0.5 milliGRAM(s) IntraMuscular every 6 hours PRN severe agitation  haloperidol    Injectable 0.5 milliGRAM(s) IV Push every 6 hours PRN severe agitation  QUEtiapine 12.5 milliGRAM(s) Oral every 6 hours PRN Agitation      PHYSICAL EXAM:  T(C): 36.7 (04-16-19 @ 06:12), Max: 36.7 (04-15-19 @ 20:09)  HR: 88 (04-16-19 @ 06:12) (88 - 93)  BP: 134/75 (04-16-19 @ 06:12) (134/75 - 146/77)  RR: 18 (04-16-19 @ 06:12) (18 - 18)  SpO2: 99% (04-16-19 @ 06:12) (99% - 100%)  I&O's Summary    GENERAL: thin female sitting in chair in NAD   MENTAL STATUS/PSYCH:  AAO x1-2 (self, hospital)   HEAD:  Atraumatic, Normocephalic  EYES: EOMI, PERRLA, conjunctiva and sclera clear  NECK: Supple, No elevated JVD  CHEST/LUNG: Clear to auscultation bilaterally; No wheeze  HEART: Regular rate and rhythm; No murmurs, rubs, or gallops  ABDOMEN: Soft, Nontender, Nondistended; Bowel sounds present  EXTREMITIES:  2+ Peripheral Pulses, No clubbing, cyanosis, or edema  NEUROLOGY: CN II-XII grossly intact, moving all extremities  SKIN: No rashes or lesions    LABS:  CAPILLARY BLOOD GLUCOSE
Patient is a 74y old  Female who presents with a chief complaint of Aggressive behavior (17 Apr 2019 10:39)      SUBJECTIVE / OVERNIGHT EVENTS:    No acute event. No new complaint     Review of Systems:    RESPIRATORY: No cough, wheezing, chills or hemoptysis; No shortness of breath  CARDIOVASCULAR: No chest pain, palpitations, dizziness, or leg swelling  GASTROINTESTINAL: No abdominal or epigastric pain. No nausea, vomiting, or hematemesis; No diarrhea or constipation. No melena or hematochezia.      MEDICATIONS  (STANDING):  atorvastatin 20 milliGRAM(s) Oral at bedtime  QUEtiapine 25 milliGRAM(s) Oral <User Schedule>    MEDICATIONS  (PRN):  acetaminophen   Tablet .. 650 milliGRAM(s) Oral every 6 hours PRN Moderate Pain (4 - 6)  haloperidol    Injectable 0.5 milliGRAM(s) IntraMuscular every 6 hours PRN severe agitation  haloperidol    Injectable 0.5 milliGRAM(s) IV Push every 6 hours PRN severe agitation  QUEtiapine 12.5 milliGRAM(s) Oral every 6 hours PRN Agitation      PHYSICAL EXAM:  T(C): 36.8 (04-18-19 @ 06:47), Max: 36.9 (04-17-19 @ 13:49)  HR: 65 (04-18-19 @ 06:47) (65 - 90)  BP: 120/83 (04-18-19 @ 06:47) (108/77 - 120/83)  RR: 18 (04-18-19 @ 06:47) (18 - 18)  SpO2: 100% (04-18-19 @ 06:47) (100% - 100%)  I&O's Summary    GENERAL: thin female sitting in chair in NAD   MENTAL STATUS/PSYCH:  AAO x1-2 (self, hospital)   HEAD:  Atraumatic, Normocephalic  EYES: EOMI, PERRLA, conjunctiva and sclera clear  NECK: Supple, No elevated JVD  CHEST/LUNG: Clear to auscultation bilaterally; No wheeze  HEART: Regular rate and rhythm; No murmurs, rubs, or gallops  ABDOMEN: Soft, Nontender, Nondistended; Bowel sounds present  EXTREMITIES:  2+ Peripheral Pulses, No clubbing, cyanosis, or edema  NEUROLOGY: CN II-XII grossly intact, moving all extremities  SKIN: No rashes or lesions    LABS:  CAPILLARY BLOOD GLUCOSE
Patient is a 74y old  Female who presents with a chief complaint of Aggressive behavior (18 Apr 2019 12:44)      SUBJECTIVE / OVERNIGHT EVENTS:    No acute event o/n. Pt remains calm. no new complaint     Review of Systems:    RESPIRATORY: No cough, wheezing, chills or hemoptysis; No shortness of breath  CARDIOVASCULAR: No chest pain, palpitations, dizziness, or leg swelling  GASTROINTESTINAL: No abdominal or epigastric pain. No nausea, vomiting, or hematemesis; No diarrhea or constipation. No melena or hematochezia.    MEDICATIONS  (STANDING):  atorvastatin 20 milliGRAM(s) Oral at bedtime  QUEtiapine 25 milliGRAM(s) Oral <User Schedule>    MEDICATIONS  (PRN):  acetaminophen   Tablet .. 650 milliGRAM(s) Oral every 6 hours PRN Moderate Pain (4 - 6)  haloperidol    Injectable 0.5 milliGRAM(s) IntraMuscular every 6 hours PRN severe agitation  haloperidol    Injectable 0.5 milliGRAM(s) IV Push every 6 hours PRN severe agitation  QUEtiapine 12.5 milliGRAM(s) Oral every 6 hours PRN Agitation      PHYSICAL EXAM:  T(C): 37 (04-19-19 @ 13:28), Max: 37 (04-18-19 @ 14:15)  HR: 79 (04-19-19 @ 13:28) (72 - 81)  BP: 119/66 (04-19-19 @ 13:28) (108/72 - 126/84)  RR: 18 (04-19-19 @ 13:28) (18 - 18)  SpO2: 100% (04-19-19 @ 13:28) (98% - 100%)  I&O's Summary    GENERAL: thin female sitting in chair in NAD   MENTAL STATUS/PSYCH:  AAO x1-2 (self, hospital)   HEAD:  Atraumatic, Normocephalic  EYES: EOMI, PERRLA, conjunctiva and sclera clear  NECK: Supple, No elevated JVD  CHEST/LUNG: Clear to auscultation bilaterally; No wheeze  HEART: Regular rate and rhythm; No murmurs, rubs, or gallops  ABDOMEN: Soft, Nontender, Nondistended; Bowel sounds present  EXTREMITIES:  2+ Peripheral Pulses, No clubbing, cyanosis, or edema  NEUROLOGY: CN II-XII grossly intact, moving all extremities  SKIN: No rashes or lesions    LABS:  CAPILLARY BLOOD GLUCOSE
Patient is a 74y old  Female who presents with a chief complaint of Aggressive behavior (2019 13:36)    SUBJECTIVE / OVERNIGHT EVENTS:    No acute events overnight. Patient has no complaints. Did not need PRN. No issues as per staff    MEDICATIONS  (STANDING):  atorvastatin 20 milliGRAM(s) Oral at bedtime  QUEtiapine 25 milliGRAM(s) Oral <User Schedule>    MEDICATIONS  (PRN):  acetaminophen   Tablet .. 650 milliGRAM(s) Oral every 6 hours PRN Moderate Pain (4 - 6)  haloperidol    Injectable 0.5 milliGRAM(s) IntraMuscular every 6 hours PRN severe agitation  haloperidol    Injectable 0.5 milliGRAM(s) IV Push every 6 hours PRN severe agitation  QUEtiapine 12.5 milliGRAM(s) Oral every 6 hours PRN Agitation        CAPILLARY BLOOD GLUCOSE        I&O's Summary    Vital Signs Last 24 Hrs  T(C): 36.2 (2019 04:30), Max: 36.8 (2019 19:10)  T(F): 97.2 (2019 04:30), Max: 98.2 (2019 19:10)  HR: 72 (2019 04:30) (72 - 88)  BP: 134/89 (2019 04:30) (134/89 - 153/83)  BP(mean): --  RR: 16 (2019 04:30) (16 - 16)  SpO2: 100% (2019 04:30) (100% - 100%)    PHYSICAL EXAM  GENERAL: NAD, well-developed, Thin  HEAD:  Atraumatic, Normocephalic  EYES: EOMI, PERRLA, conjunctiva and sclera clear  NECK: Supple, No JVD  CHEST/LUNG: Clear to auscultation bilaterally; No wheeze  HEART: Regular rate and rhythm; No murmurs, rubs, or gallops  ABDOMEN: Soft, Nontender, Nondistended; Bowel sounds present  EXTREMITIES:  2+ Peripheral Pulses, No clubbing, cyanosis, or edema  PSYCH: AAOx2  SKIN: No rashes or lesions    LABS:                        12.8   5.37  )-----------( 202      ( 2019 17:44 )             39.8     04-11    141  |  103  |  10  ----------------------------<  88  3.9   |  27  |  0.78    Ca    9.7      2019 17:44    TPro  7.8  /  Alb  4.3  /  TBili  0.6  /  DBili  x   /  AST  21  /  ALT  9   /  AlkPhos  89  04-11          Urinalysis Basic - ( 2019 20:20 )    Color: LIGHT YELLOW / Appearance: CLEAR / S.012 / pH: 6.5  Gluc: NEGATIVE / Ketone: NEGATIVE  / Bili: NEGATIVE / Urobili: NORMAL   Blood: NEGATIVE / Protein: NEGATIVE / Nitrite: NEGATIVE   Leuk Esterase: NEGATIVE / RBC: x / WBC x   Sq Epi: x / Non Sq Epi: x / Bacteria: x        RADIOLOGY & ADDITIONAL TESTS:    Imaging Personally Reviewed:  Consultant(s) Notes Reviewed:  Psych  Care Discussed with Consultants/Other Providers:

## 2019-04-19 NOTE — PROGRESS NOTE ADULT - PROBLEM SELECTOR PLAN 3
- Continue atorvastatin

## 2019-04-19 NOTE — DISCHARGE NOTE NURSING/CASE MANAGEMENT/SOCIAL WORK - NSDCCRNAME_GEN_ALL_CORE_FT
[facility] at [address] [address]  [facility]

## 2019-04-19 NOTE — PROGRESS NOTE ADULT - PROBLEM SELECTOR PLAN 2
- Increasingly aggressive behavior. daughter stating it's difficult to care for her at home. Requesting NH placement  -F/U CM/SW.  - Check RPR, B12/Folate, TSH was WNL
- Increasingly aggressive behavior. daughter stating it's difficult to care for her at home. Requesting NH placement  -F/U CM/SW.
- Increasingly aggressive behavior. daughter stating it's difficult to care for her at home. Requesting NH placement  -F/U CM/SW.  - Check RPR, B12/Folate, TSH was WNL
- Increasingly aggressive behavior. daughter stating it's difficult to care for her at home. Requesting NH placement  -F/U CM/SW.  - RPR, B12/Folate, TSH was WNL

## 2019-04-19 NOTE — PROGRESS NOTE ADULT - REASON FOR ADMISSION
Aggressive behavior

## 2019-04-19 NOTE — DISCHARGE NOTE NURSING/CASE MANAGEMENT/SOCIAL WORK - NSDCDPATPORTLINK_GEN_ALL_CORE
You can access the [facility] Portal, offered by [facility], by registering with the following website: [email or web address]

## 2019-04-19 NOTE — PROGRESS NOTE ADULT - PROBLEM SELECTOR PLAN 1
- Pt appears calm in ED  - No apparent toxic metabolic derangements. No signs or symptoms of infection   - Chronic microvascular changes on CT head  - Behavior likely related to known dementia. Likely progressing  - Psych re appreciated.  - Haldol/seroquel prn agitation. and seroquel standing  - Requesting long term placement. F/U CM/SW.

## 2019-04-19 NOTE — PROGRESS NOTE ADULT - PROBLEM SELECTOR PLAN 4
-nutrition eval/recs appreciated  -start nutrition supplement ensure
-nutrition eval/recs appreciated  -start nutrition supplement ensure

## 2019-04-19 NOTE — PROGRESS NOTE ADULT - ATTENDING COMMENTS
stable for discharge. Awaiting placement
Dae Hyeon Kim [id number]  Chief Resident  Department of Internal Medicine  Pager [phone]
Dae Hyeon Kim MD-[id number]  Chief Resident  Department of Internal Medicine  Pager [phone]
stable for discharge. Awaiting placement
stable for discharge today  total time spent on discharge 35min

## 2019-04-19 NOTE — PROGRESS NOTE ADULT - ASSESSMENT
73 yo F with h/o alzheimer dementia brought by her daughter for aggressive behavior at home.
73 yo F with h/o dementia brought by her daughter for aggressive behavior at home.
75 yo F with h/o alzheimer dementia brought by her daughter for aggressive behavior at home.
75 yo F with h/o alzheimer dementia brought by her daughter for aggressive behavior at home.
75 yo F with h/o dementia brought by her daughter for aggressive behavior at home.

## 2019-11-25 NOTE — ED PROVIDER NOTE - CHPI ED SYMPTOMS NEG
no fever What Is The Reason For Today's Visit?: Full Body Skin Examination What Is The Reason For Today's Visit? (Being Monitored For X): the development of new lesions

## 2023-09-27 NOTE — PATIENT PROFILE ADULT - FALLEN IN THE PAST
Prior Authorization Request  Who's requesting:  Pharmacy   Pharmacy Name and Location:   [facility] - [address]   Medication Name: DULoxetine (CYMBALTA) 30 MG capsule   Insurance Plan: [employer] [facility]   Insurance Member ID Number: [id number]   CoverMyMeds Key: [id number]  Informed patient that prior authorizations can take up to 10 business days for response:   Yes  Okay to leave a detailed message: No          no